# Patient Record
Sex: FEMALE
[De-identification: names, ages, dates, MRNs, and addresses within clinical notes are randomized per-mention and may not be internally consistent; named-entity substitution may affect disease eponyms.]

---

## 2018-01-19 ENCOUNTER — HOSPITAL ENCOUNTER (OUTPATIENT)
Dept: HOSPITAL 14 - H.OPSURG | Age: 53
Discharge: HOME | End: 2018-01-19
Attending: SPECIALIST
Payer: COMMERCIAL

## 2018-01-19 VITALS
HEART RATE: 68 BPM | OXYGEN SATURATION: 98 % | TEMPERATURE: 98 F | DIASTOLIC BLOOD PRESSURE: 73 MMHG | SYSTOLIC BLOOD PRESSURE: 116 MMHG

## 2018-01-19 VITALS — RESPIRATION RATE: 18 BRPM

## 2018-01-19 VITALS — BODY MASS INDEX: 27.3 KG/M2

## 2018-01-19 DIAGNOSIS — K21.9: ICD-10-CM

## 2018-01-19 DIAGNOSIS — N84.1: Primary | ICD-10-CM

## 2018-01-19 DIAGNOSIS — I10: ICD-10-CM

## 2018-01-19 DIAGNOSIS — K44.9: ICD-10-CM

## 2018-01-19 PROCEDURE — 88305 TISSUE EXAM BY PATHOLOGIST: CPT

## 2018-01-19 PROCEDURE — 58558 HYSTEROSCOPY BIOPSY: CPT

## 2018-01-20 NOTE — OP
PROCEDURE DATE:  01/19/2018



PREOPERATIVE DIAGNOSES:  Endometrial and endocervical polyps.



POSTOPERATIVE DIAGNOSES:

1.  Endometrial and endocervical polyps.

2.  Pending pathology report.



PROCEDURE PERFORMED:  Hysteroscopy with MyoSure polypectomy and

endocervical polypectomy.



TYPE OF ANESTHESIA:  General endotracheal.



ANESTHESIA ADMINISTERED BY:  Dr. Isaac.



FINDINGS:

1.  Live endocervical polyp noted removed in toto.

2.  Hysteroscopy revealed further endocervical polyps and a small

endometrial polyp.  Using MyoSure technique, polypectomy was performed.



ESTIMATED BLOOD LOSS:  5 mL.



DRAINS USED:  None.



REPLACEMENTS USED:  None.



DESCRIPTION OF PROCEDURE:  The patient was taken to the operating room and

placed on the operating table in a supine position.  Following induction of

general endotracheal anesthesia, the patient was then replaced in the

dorsal lithotomy position.  Perineal, genital areas were draped and prepped

in a usual sterile manner.  At this time, a sterile catheter was then

placed into the bladder and clear fluid was then evacuated from the

bladder.  The patient was then examined under anesthesia with the above

findings.  Heavy-weighted speculum was then placed in the posterior wall of

the vagina exposing the cervix.  At the cervical os, a large endocervical

polyp noted to be present.  At this time, the polyp was grasped, twisted

and removed in toto and sent to Pathology for pathological evaluation. 

Following this, the endocervical canal was then grasped using a

single-tooth tenaculum and removed and moved superiorly.  At this time, the

endocervical canal was then dilated using Sathya dilators in an increasing

size manner.  Under direct visualization, a hysteroscope was then placed in

the endocervical canal and advanced under direct visualization into the

endometrial cavity.  In the endometrial cavity, a small polyp noted to be

present.  No sloughing or endometrial active bleeding noted and at this

time using MyoSure technique, the endometrial polyp was then removed in

toto.  The hysteroscope was then slowly removed under direct visualization 
showing the endocervical

canal and another polyp and again using the MyoSure technique, endocervical

polyp was then removed in toto.  Specimens were then sent to pathology. 

The hysteroscope was then removed.  Single-tooth tenaculum removed.  No

bleeding noted from the tenaculum site.  At this time, no active bleeding

and the patient tolerated the procedure well.  There were no complications.

She was transferred to the recovery room in satisfactory condition.



__________________________________________

Rishabh Pickard MD





DD:  01/20/2018 8:23:08

DT:  01/20/2018 9:05:56

Job # [id number]





MTDD